# Patient Record
(demographics unavailable — no encounter records)

---

## 2025-04-09 NOTE — ASSESSMENT
[FreeTextEntry1] : Very pleasant 56-year-old woman who presents for evaluation of left ureteral stone status post stent -CT images reviewed with the patient and her  demonstrating an approximately 8 to 9 mm distal left ureteral stone -Fluoroscopy images reviewed with the patient and her  demonstrating stent placement -I discussed the different treatment modalities for nephrolithiasis with the patient, including medical management, spontaneous stone passage, percutaneous stone extraction, extracorporeal shock wave lithotripsy, and ureteroscopy with laser lithotripsy and stone extraction. Given the size and location of the stone, I recommend and the patient opted to proceed with ureteroscopy.  The risks, benefits, and alternatives to ureteroscopy were discussed with the patient, including but not limited to pain, infection, bleeding, bladder injury, ureteral injury, renal injury, and treatment failure.  I also discussed the possible need for a temporary ureteral stent.  I discussed the possible side effects of a temporary ureteral stent, including flank pain, hematuria, and bladder spasms.  The patient understands that a stent is a temporary implant that must be removed in the future.  The patient wishes to proceed and we will schedule the surgery for the near future. -Urine culture  Patient is being seen today for evaluation and management of a chronic and longitudinal ongoing condition and I am the primary treating physician

## 2025-04-09 NOTE — HISTORY OF PRESENT ILLNESS
[FreeTextEntry1] : Very pleasant 56-year-old woman who presents for evaluation of left ureteral stone status post stent.  She recently went to the emergency department complaining of left flank pain.  She reports a history of kidney stones status post surgery 3 years ago.  She was found to have an approximately 8 to 9 mm distal left ureteral stone with hydronephrosis.  She was taken to the operating room emergently for a left ureteral stent placement.  She now presents to discuss options for definitive stone management.

## 2025-04-09 NOTE — PHYSICAL EXAM
[Normal Appearance] : normal appearance [Well Groomed] : well groomed [General Appearance - In No Acute Distress] : no acute distress [Edema] : no peripheral edema [Respiration, Rhythm And Depth] : normal respiratory rhythm and effort [Exaggerated Use Of Accessory Muscles For Inspiration] : no accessory muscle use [Abdomen Soft] : soft [Abdomen Tenderness] : non-tender [Costovertebral Angle Tenderness] : no ~M costovertebral angle tenderness [Normal Station and Gait] : the gait and station were normal for the patient's age [] : no rash [Oriented To Time, Place, And Person] : oriented to person, place, and time [Affect] : the affect was normal [Mood] : the mood was normal

## 2025-04-30 NOTE — ASSESSMENT
[FreeTextEntry1] : Very pleasant 56-year-old woman presents for follow-up of left ureteral stone status post stent, recent UTI, recent stone passage - Kidney stone analysis - Urinalysis - Urine culture - Stent removal in 2 days

## 2025-04-30 NOTE — HISTORY OF PRESENT ILLNESS
[FreeTextEntry1] : Very pleasant 56-year-old woman who presents for evaluation of left ureteral stone status post stent.  She recently went to the emergency department complaining of left flank pain.  She reports a history of kidney stones status post surgery 3 years ago.  She was found to have an approximately 8 to 9 mm distal left ureteral stone with hydronephrosis.  She was taken to the operating room emergently for a left ureteral stent placement.  She reports that she recently passed the stone and brings it in today for analysis.  A few weeks ago she was found to have a urinary tract infection and completed a course of antibiotics.

## 2025-06-03 NOTE — ASSESSMENT
[FreeTextEntry1] : Very pleasant 56-year-old woman presents for follow-up of kidney stones - Kidney stone analysis demonstrates stone composition of 80% calcium oxalate monohydrate, 10% calcium oxalate dihydrate, 10% calcium phosphate (apatite) -[facility] urinalysis -CMP -A1c -PTH -TSH -Uric acid - Repeat renal ultrasound in 1 month for stone surveillance  Patient is being seen today for evaluation and management of a chronic and longitudinal ongoing condition and I am the primary treating physician

## 2025-06-03 NOTE — HISTORY OF PRESENT ILLNESS
[FreeTextEntry1] : Very pleasant 56-year-old woman who presents for evaluation of left ureteral stone status post stent.  She recently went to the emergency department complaining of left flank pain.  She reports a history of kidney stones status post surgery 3 years ago.  She was found to have an approximately 8 to 9 mm distal left ureteral stone with hydronephrosis.  She was taken to the operating room emergently for a left ureteral stent placement.  She subsequently passed the stone.  Stent was removed.  Calculus analysis demonstrated 80% calcium oxalate monohydrate, 10% calcium oxalate dihydrate, 10% calcium phosphate (apatite).

## 2025-07-08 NOTE — ASSESSMENT
[FreeTextEntry1] : Very pleasant 56-year-old woman who presents for follow-up of kidney stones - Ultrasound images reviewed demonstrating a 3.5 mm echogenic focus in the left kidney but no shadowing nor twinkle artifact - We again discussed general stone prevention strategies - Repeat renal ultrasound in 6 months and follow-up at that time  Patient is being seen today for evaluation and management of a chronic and longitudinal ongoing condition and I am the primary treating physician   I have spent 21 minutes on this encounter, exclusive of separately billed services